# Patient Record
Sex: FEMALE | Race: WHITE | NOT HISPANIC OR LATINO | URBAN - METROPOLITAN AREA
[De-identification: names, ages, dates, MRNs, and addresses within clinical notes are randomized per-mention and may not be internally consistent; named-entity substitution may affect disease eponyms.]

---

## 2017-04-10 ENCOUNTER — PATIENT MESSAGE (OUTPATIENT)
Dept: OBSTETRICS AND GYNECOLOGY | Facility: CLINIC | Age: 27
End: 2017-04-10

## 2017-04-11 ENCOUNTER — TELEPHONE (OUTPATIENT)
Dept: OBSTETRICS AND GYNECOLOGY | Facility: CLINIC | Age: 27
End: 2017-04-11

## 2017-04-12 ENCOUNTER — TELEPHONE (OUTPATIENT)
Dept: OBSTETRICS AND GYNECOLOGY | Facility: CLINIC | Age: 27
End: 2017-04-12

## 2017-04-12 DIAGNOSIS — Z30.41 ENCOUNTER FOR SURVEILLANCE OF CONTRACEPTIVE PILLS: Primary | ICD-10-CM

## 2017-04-12 RX ORDER — NORGESTIMATE AND ETHINYL ESTRADIOL 7DAYSX3 28
1 KIT ORAL DAILY
Qty: 28 TABLET | Refills: 5 | Status: SHIPPED | OUTPATIENT
Start: 2017-04-12 | End: 2020-03-23

## 2017-04-12 RX ORDER — BUSPIRONE HYDROCHLORIDE 10 MG/1
10 TABLET ORAL 2 TIMES DAILY
Qty: 60 TABLET | Refills: 5 | Status: SHIPPED | OUTPATIENT
Start: 2017-04-12 | End: 2020-03-23

## 2018-02-15 NOTE — TELEPHONE ENCOUNTER
----- Message from Triston Rendon sent at 4/10/2017  9:44 AM CDT -----  Contact: Patient  Please call back regarding a refill on Sprintech and Buresperone  Pt states that she is completely out of her medication. Pt can be reached @  ..[phone] (home) thank you/NH  Please send to:   [facility] in [place].             See H&P

## 2019-10-03 PROBLEM — R50.9 FEVER: Status: ACTIVE | Noted: 2019-10-03

## 2020-04-20 PROBLEM — R50.9 FEVER: Status: RESOLVED | Noted: 2019-10-03 | Resolved: 2020-04-20

## 2020-04-20 PROBLEM — F41.9 ANXIETY: Status: ACTIVE | Noted: 2020-04-20

## 2021-04-29 ENCOUNTER — PATIENT MESSAGE (OUTPATIENT)
Dept: RESEARCH | Facility: HOSPITAL | Age: 31
End: 2021-04-29